# Patient Record
Sex: FEMALE | Race: WHITE | NOT HISPANIC OR LATINO | URBAN - METROPOLITAN AREA
[De-identification: names, ages, dates, MRNs, and addresses within clinical notes are randomized per-mention and may not be internally consistent; named-entity substitution may affect disease eponyms.]

---
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
## 2024-09-08 NOTE — ED PROVIDER NOTE - PROGRESS NOTE ADDITIONAL3
If you are a smoker, it is important for your health to stop smoking. Please be aware that second hand smoke is also harmful. Additional Progress Note...

## 2025-04-30 PROCEDURE — 90792 PSYCH DIAG EVAL W/MED SRVCS: CPT | Mod: 93
